# Patient Record
Sex: FEMALE | Race: WHITE | NOT HISPANIC OR LATINO | URBAN - METROPOLITAN AREA
[De-identification: names, ages, dates, MRNs, and addresses within clinical notes are randomized per-mention and may not be internally consistent; named-entity substitution may affect disease eponyms.]

---
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
## 2018-07-05 ENCOUNTER — TELEPHONE (OUTPATIENT)
Dept: OBSTETRICS AND GYNECOLOGY | Facility: CLINIC | Age: 18
End: 2018-07-05

## 2018-07-05 RX ORDER — NORETHINDRONE ACETATE AND ETHINYL ESTRADIOL AND FERROUS FUMARATE 1.5-30(21)
1 KIT ORAL DAILY
Qty: 28 TABLET | Refills: 5 | Status: SHIPPED | OUTPATIENT
Start: 2018-07-05 | End: 2018-12-16 | Stop reason: SDUPTHER

## 2018-07-05 NOTE — TELEPHONE ENCOUNTER
Patient has an appointment scheduled with Dr. Hughes on 12/18/2018 for her annual. I will send in enough refills until her appointment. E-Rx sent to [facility] on [address].

## 2018-07-05 NOTE — TELEPHONE ENCOUNTER
Dr. Hughes    Patient needs refill for birth control Junel Fe the stronger  Dosage. This type works best and would like refills for 3 months at a time with enough refills to get her to Dec.  Appointment.     Pt call back  [phone]    [facility] [address] [phone]

## 2018-12-17 RX ORDER — NORETHINDRONE ACETATE AND ETHINYL ESTRADIOL AND FERROUS FUMARATE 1.5-30(21)
KIT ORAL
Qty: 28 TABLET | Refills: 1 | Status: SHIPPED | OUTPATIENT
Start: 2018-12-17 | End: 2020-01-02 | Stop reason: SDUPTHER

## 2020-01-02 ENCOUNTER — OFFICE VISIT (OUTPATIENT)
Dept: OBSTETRICS AND GYNECOLOGY | Facility: CLINIC | Age: 20
End: 2020-01-02

## 2020-01-02 VITALS
WEIGHT: 125 LBS | DIASTOLIC BLOOD PRESSURE: 70 MMHG | BODY MASS INDEX: 23 KG/M2 | SYSTOLIC BLOOD PRESSURE: 116 MMHG | HEIGHT: 62 IN

## 2020-01-02 DIAGNOSIS — Z01.419 ENCOUNTER FOR GYNECOLOGICAL EXAMINATION WITHOUT ABNORMAL FINDING: Primary | ICD-10-CM

## 2020-01-02 PROCEDURE — 99395 PREV VISIT EST AGE 18-39: CPT | Performed by: OBSTETRICS & GYNECOLOGY

## 2020-01-02 RX ORDER — NORETHINDRONE ACETATE AND ETHINYL ESTRADIOL AND FERROUS FUMARATE 1.5-30(21)
1 KIT ORAL DAILY
Qty: 84 TABLET | Refills: 3 | Status: SHIPPED | OUTPATIENT
Start: 2020-01-02 | End: 2020-12-11

## 2020-01-02 NOTE — PROGRESS NOTES
Subjective   Chief Complaint   Patient presents with   • Annual Exam     Aileen Mcmanus is a 19 y.o. year old  presenting to be seen for her annual exam.    Current birth control method: abstinence and OCP (estrogen/progesterone).    Patient's last menstrual period was 2019.    She is not sexually active.   Condoms are not typically used.    [place] is painful or she is having problems :not asked  She has concerns about domestic violence: not asked.    Cycle Frequency: regular, predictable and consistent every 28 - 32 days   Menstrual cycle character: flow is typically normal   Cycle Duration: 5 - 6   Number of heavy days of flows: 0   Intermenstrual bleeding present: no   Post-coital bleeding present: not asked     She exercises regularly: yes.  Self breast awareness:not asked    Calcium intake: is not adequate.2  Caffeine intake: no or mild caffeine use  Social History    Tobacco Use      Smoking status: Never Smoker      Smokeless tobacco: Never Used    Social History     Substance and Sexual Activity   Alcohol Use Yes   • Frequency: Monthly or less        The following portions of the patient's history were reviewed and updated as is  appropriate:problem list, current medications, allergies, past family history, past medical history, past social history and past surgical history.    Current Outpatient Medications:   •  JUNEL FE 1.5/30 1.5-30 MG-MCG tablet, Take 1 tablet by mouth Daily., Disp: 84 tablet, Rfl: 3    Review of systems  Constitutional    POS nothing reported                            NEG anorexia, chills or night sweats  Breast                POS nothing reported                            NEG persistent breast lump, skin dimpling or nipple discharge  GI                      POS diarrhea and it IS NOT effecting her daily living                            NEG bloating, change in bowel habits, melena or reflux symptoms                       POS nothing reported                          "   NEG dysuria, frequency or hematuria       Objective   /70   Ht 157.5 cm (62\")   Wt 56.7 kg (125 lb)   LMP 12/18/2019   Breastfeeding No   BMI 22.86 kg/m²       General:  well developed; well nourished  no acute distress  appears stated age   Skin:  No suspicious lesions seen   Thyroid:    Breasts:  Not performed.   Abdomen: soft, non-tender; no masses  no umbilical or inguinal hernias are present  no hepato-splenomegaly   Pelvis: Not performed.       Lab Review   No data reviewed    Imaging  No data reviewed       Assessment     1. Doing well on OCP's  2. S/p gardisil             Plan     1. Annual examination or sooner as needed  2. 1000 mg calcium in divided doses ideally in diet; regular exercise  3. Self breast awareness if > 30 years of age  4.                New Medications Ordered This Visit   Medications   • JUNEL FE 1.5/30 1.5-30 MG-MCG tablet     Sig: Take 1 tablet by mouth Daily.     Dispense:  84 tablet     Refill:  3     No orders of the defined types were placed in this encounter.          This note was electronically signed.    Cuong Hughes MD  1/2/2020    "

## 2020-12-11 RX ORDER — NORETHINDRONE ACETATE AND ETHINYL ESTRADIOL AND FERROUS FUMARATE 1.5-30(21)
KIT ORAL
Qty: 84 TABLET | Refills: 0 | Status: SHIPPED | OUTPATIENT
Start: 2020-12-11 | End: 2021-03-11 | Stop reason: SDUPTHER

## 2021-03-05 RX ORDER — NORETHINDRONE ACETATE AND ETHINYL ESTRADIOL AND FERROUS FUMARATE 1.5-30(21)
KIT ORAL
Qty: 84 TABLET | Refills: 0 | OUTPATIENT
Start: 2021-03-05

## 2021-03-08 ENCOUNTER — TELEPHONE (OUTPATIENT)
Dept: OBSTETRICS AND GYNECOLOGY | Facility: CLINIC | Age: 21
End: 2021-03-08

## 2021-03-08 NOTE — TELEPHONE ENCOUNTER
Patient called and stated that her prescription refill was denied and wanted to know why.    Please give her a call.

## 2021-03-11 RX ORDER — NORETHINDRONE ACETATE AND ETHINYL ESTRADIOL AND FERROUS FUMARATE 1.5-30(21)
1 KIT ORAL DAILY
Qty: 84 TABLET | Refills: 0 | Status: CANCELLED | OUTPATIENT
Start: 2021-03-11

## 2021-03-11 RX ORDER — NORETHINDRONE ACETATE AND ETHINYL ESTRADIOL AND FERROUS FUMARATE 1.5-30(21)
1 KIT ORAL DAILY
Qty: 28 TABLET | Refills: 0 | Status: SHIPPED | OUTPATIENT
Start: 2021-03-11 | End: 2021-03-17 | Stop reason: SDUPTHER

## 2021-03-11 NOTE — TELEPHONE ENCOUNTER
PT CALLED NEEDS REFILL OF HER BIRTH CONTROL - I ADVISED NEEDED APPT - SHE IS NOT COMFORTABLE COMING TO THE OFFICE SO WE SCHEDULED A TELEPHONE VISIST FOR NEXT WEDNESDAY - I ADV WOULD SEND TO NURSE AND POSSIBLY GET A REFILL OF HER B/C UNTIL THEN

## 2021-03-17 ENCOUNTER — OFFICE VISIT (OUTPATIENT)
Dept: OBSTETRICS AND GYNECOLOGY | Facility: CLINIC | Age: 21
End: 2021-03-17

## 2021-03-17 VITALS — WEIGHT: 125 LBS | BODY MASS INDEX: 22.86 KG/M2

## 2021-03-17 DIAGNOSIS — N92.0 MENORRHAGIA WITH REGULAR CYCLE: Primary | ICD-10-CM

## 2021-03-17 PROCEDURE — 99441 PR PHYS/QHP TELEPHONE EVALUATION 5-10 MIN: CPT | Performed by: OBSTETRICS & GYNECOLOGY

## 2021-03-17 RX ORDER — NORETHINDRONE ACETATE AND ETHINYL ESTRADIOL AND FERROUS FUMARATE 1.5-30(21)
1 KIT ORAL DAILY
Qty: 84 TABLET | Refills: 3 | Status: SHIPPED | OUTPATIENT
Start: 2021-03-17

## 2021-03-17 RX ORDER — CETIRIZINE HYDROCHLORIDE 10 MG/1
10 TABLET ORAL DAILY
COMMUNITY

## 2021-03-17 NOTE — PROGRESS NOTES
Subjective   Chief Complaint   Patient presents with   • Med Refill     til she can come in for annual     Aileen Seemilan is a 20 y.o. year old .  Patient's last menstrual period was 03/10/2021.  She consents to be seen by telephone visit due to the COVID-19 pandemic.   she needs a refill of her birth control pills.  She is not sexually active they have helped control her irregular cycles and her heavy cycles.  She denies any problems with them no acne skin pigmentation changes, no breast tenderness.  She is having no problems with her bladder or her bowels no vaginal discharge may be a little weight gain associated with the COVID-19 pandemic    Patient's last menstrual period was 03/10/2021.  Cycle Frequency: regular, predictable and consistent every 28 - 32 days   Menstrual cycle character: flow is typically normal   Cycle Duration: 3 - 4                   The following portions of the patient's history were reviewed and updated as appropriate:problem list, current medications, allergies and past family history      Current Outpatient Medications:   •  cetirizine (zyrTEC) 10 MG tablet, Take 10 mg by mouth Daily., Disp: , Rfl:   •  Junel FE 1.5/30 1.5-30 MG-MCG tablet, Take 1 tablet by mouth Daily., Disp: 84 tablet, Rfl: 3  •  Probiotic Product (PROBIOTIC DAILY PO), Take  by mouth., Disp: , Rfl:     no or minimal alcohol, nonsmoker, no or mild caffeine use    Review of Systems no problems with bladder bowels no cough cold signs of coronavirus.  No vaginal discharges           Objective   Wt 56.7 kg (125 lb)   LMP 03/10/2021   Breastfeeding No   BMI 22.86 kg/m²      General:  no acute distress   Skin:  Not performed.   Thyroid: not examined   Lungs:  breathing is unlabored   Heart:  Not performed.   Abdomen: Not performed.   Pelvis: Not performed.     Lab Review   No data reviewed    Imaging   No data reviewed              Assessment     1. Telephone visit for oral contraceptive pill refill.  She has had no  problems with the birth control pills and they have helped control her heavy cycles.   2.  She is not sexually active.  Blood pressure was normal and she has no complaints.         Plan     1. Refill birth control pills for 1 year  2. Discussed my nursing home and that one of my partners will be happy to see her as needed or for her annual examination after the COVID-19 pandemic resolves.  3. She is to follow-up with us if she has any problems in the meantime.        No orders of the defined types were placed in this encounter.    New Medications Ordered This Visit   Medications   • Junel FE 1.5/30 1.5-30 MG-MCG tablet     Sig: Take 1 tablet by mouth Daily.     Dispense:  84 tablet     Refill:  3              This note was electronically signed.  I spent 8 minutes with this patient during this telephone visit    Cuong Hughes MD  March 17, 2021